# Patient Record
Sex: MALE | URBAN - METROPOLITAN AREA
[De-identification: names, ages, dates, MRNs, and addresses within clinical notes are randomized per-mention and may not be internally consistent; named-entity substitution may affect disease eponyms.]

---

## 2021-01-12 LAB — PROCALCITONIN: 0.03 NG/ML

## 2021-02-22 ENCOUNTER — VIRTUAL VISIT (OUTPATIENT)
Dept: INFECTIOUS DISEASES | Age: 86
End: 2021-02-22

## 2021-02-22 DIAGNOSIS — U07.1 COVID-19: Primary | ICD-10-CM

## 2021-02-22 PROCEDURE — 99212 OFFICE O/P EST SF 10 MIN: CPT | Performed by: INTERNAL MEDICINE

## 2021-02-22 ASSESSMENT — ENCOUNTER SYMPTOMS
SHORTNESS OF BREATH: 0
COUGH: 0
GASTROINTESTINAL NEGATIVE: 1

## 2021-02-22 NOTE — PROGRESS NOTES
Infectious Disease     Patient Name: Lisbeth Ambriz  Date: 2/22/2021  YOB: 1934  Medical Record Number: [id number]        Lisbeth Ambriz is a 80 y.o. male being evaluated by a Virtual Visit (video visit) encounter to address concerns as mentioned above. A caregiver was present when appropriate. Due to this being a TeleHealth encounter (During [facility] public health emergency), evaluation of the following organ systems was limited: Vitals/Constitutional/EENT/Resp/CV/GI//MS/Neuro/Skin/Heme-Lymph-Imm. Pursuant to the emergency declaration under the [address] [place] [address], [address] [place] [address] authority and the [employer] Act, this Virtual Visit was conducted with patient's (and/or legal guardian's) consent, to reduce the patient's risk of exposure to COVID-19 and provide necessary medical care. The patient (and/or legal guardian) has also been advised to contact this office for worsening conditions or problems, and seek emergency medical treatment and/or call 911 if deemed necessary. Patient identification was verified at the start of the visit: Yes    Total time spent for this encounter:15min        Services were provided through a video synchronous discussion virtually to substitute for in-person clinic visit. Patient and provider were located at their individual homes. --Mary Sharpe MD on 2/22/2021 at 10:36 AM    An electronic signature was used to authenticate this note. Patient was hospitalized with COVID-19 treated with remdesivir and steroids  Still wearing oxygen 3 L feeling well no fevers chills sweats no significant shortness of breath    Review of Systems   Constitutional: Negative. HENT: Negative. Respiratory: Negative for cough and shortness of breath. Cardiovascular: Negative. Gastrointestinal: Negative. Genitourinary: Negative. Review of Systems: All 14 review of systems negative other than as stated above    Social History     Tobacco Use    Smoking status: Not on file   Substance Use Topics    Alcohol use: Not on file    Drug use: Not on file               ASSESSMENT:  PLAN:    Covid recovered still on O2 slowly weaning down currently on 3 L nasal cannula    Patient has received first Covid vaccination will receive his second    Follow-up as needed

## 2023-06-20 LAB
ALANINE AMINOTRANSFERASE (SGPT) (U/L) IN SER/PLAS: 9 U/L (ref 10–52)
ALBUMIN (G/DL) IN SER/PLAS: 3.8 G/DL (ref 3.4–5)
ALKALINE PHOSPHATASE (U/L) IN SER/PLAS: 50 U/L (ref 33–136)
ANION GAP IN SER/PLAS: 12 MMOL/L (ref 10–20)
ASPARTATE AMINOTRANSFERASE (SGOT) (U/L) IN SER/PLAS: 16 U/L (ref 9–39)
BASOPHILS (10*3/UL) IN BLOOD BY AUTOMATED COUNT: 0.05 X10E9/L (ref 0–0.1)
BASOPHILS/100 LEUKOCYTES IN BLOOD BY AUTOMATED COUNT: 1 % (ref 0–2)
BILIRUBIN TOTAL (MG/DL) IN SER/PLAS: 1.1 MG/DL (ref 0–1.2)
CALCIUM (MG/DL) IN SER/PLAS: 8.9 MG/DL (ref 8.6–10.3)
CARBON DIOXIDE, TOTAL (MMOL/L) IN SER/PLAS: 22 MMOL/L (ref 21–32)
CHLORIDE (MMOL/L) IN SER/PLAS: 109 MMOL/L (ref 98–107)
CHOLESTEROL (MG/DL) IN SER/PLAS: 127 MG/DL (ref 0–199)
CHOLESTEROL IN HDL (MG/DL) IN SER/PLAS: 38.1 MG/DL
CHOLESTEROL/HDL RATIO: 3.3
CREATININE (MG/DL) IN SER/PLAS: 0.89 MG/DL (ref 0.5–1.3)
EOSINOPHILS (10*3/UL) IN BLOOD BY AUTOMATED COUNT: 0.08 X10E9/L (ref 0–0.4)
EOSINOPHILS/100 LEUKOCYTES IN BLOOD BY AUTOMATED COUNT: 1.6 % (ref 0–6)
ERYTHROCYTE DISTRIBUTION WIDTH (RATIO) BY AUTOMATED COUNT: 13.7 % (ref 11.5–14.5)
ERYTHROCYTE MEAN CORPUSCULAR HEMOGLOBIN CONCENTRATION (G/DL) BY AUTOMATED: 33 G/DL (ref 32–36)
ERYTHROCYTE MEAN CORPUSCULAR VOLUME (FL) BY AUTOMATED COUNT: 93 FL (ref 80–100)
ERYTHROCYTES (10*6/UL) IN BLOOD BY AUTOMATED COUNT: 4.75 X10E12/L (ref 4.5–5.9)
GFR MALE: 82 ML/MIN/1.73M2
GLUCOSE (MG/DL) IN SER/PLAS: 104 MG/DL (ref 74–99)
HEMATOCRIT (%) IN BLOOD BY AUTOMATED COUNT: 44 % (ref 41–52)
HEMOGLOBIN (G/DL) IN BLOOD: 14.5 G/DL (ref 13.5–17.5)
IMMATURE GRANULOCYTES/100 LEUKOCYTES IN BLOOD BY AUTOMATED COUNT: 0.4 % (ref 0–0.9)
LDL: 67 MG/DL (ref 0–99)
LEUKOCYTES (10*3/UL) IN BLOOD BY AUTOMATED COUNT: 5.1 X10E9/L (ref 4.4–11.3)
LYMPHOCYTES (10*3/UL) IN BLOOD BY AUTOMATED COUNT: 2.39 X10E9/L (ref 0.8–3)
LYMPHOCYTES/100 LEUKOCYTES IN BLOOD BY AUTOMATED COUNT: 46.5 % (ref 13–44)
MONOCYTES (10*3/UL) IN BLOOD BY AUTOMATED COUNT: 0.44 X10E9/L (ref 0.05–0.8)
MONOCYTES/100 LEUKOCYTES IN BLOOD BY AUTOMATED COUNT: 8.6 % (ref 2–10)
NEUTROPHILS (10*3/UL) IN BLOOD BY AUTOMATED COUNT: 2.16 X10E9/L (ref 1.6–5.5)
NEUTROPHILS/100 LEUKOCYTES IN BLOOD BY AUTOMATED COUNT: 41.9 % (ref 40–80)
PLATELETS (10*3/UL) IN BLOOD AUTOMATED COUNT: 169 X10E9/L (ref 150–450)
POTASSIUM (MMOL/L) IN SER/PLAS: 4 MMOL/L (ref 3.5–5.3)
PROTEIN TOTAL: 6.6 G/DL (ref 6.4–8.2)
SODIUM (MMOL/L) IN SER/PLAS: 139 MMOL/L (ref 136–145)
TRIGLYCERIDE (MG/DL) IN SER/PLAS: 108 MG/DL (ref 0–149)
UREA NITROGEN (MG/DL) IN SER/PLAS: 16 MG/DL (ref 6–23)
VLDL: 22 MG/DL (ref 0–40)

## 2023-10-23 ENCOUNTER — LAB (OUTPATIENT)
Dept: LAB | Facility: LAB | Age: 88
End: 2023-10-23
Payer: MEDICARE

## 2023-10-23 DIAGNOSIS — N18.2 CHRONIC KIDNEY DISEASE, STAGE 2 (MILD): ICD-10-CM

## 2023-10-23 DIAGNOSIS — Z12.5 ENCOUNTER FOR SCREENING FOR MALIGNANT NEOPLASM OF PROSTATE: ICD-10-CM

## 2023-10-23 DIAGNOSIS — I12.9 HYPERTENSIVE CHRONIC KIDNEY DISEASE WITH STAGE 1 THROUGH STAGE 4 CHRONIC KIDNEY DISEASE, OR UNSPECIFIED CHRONIC KIDNEY DISEASE: ICD-10-CM

## 2023-10-23 DIAGNOSIS — E78.49 OTHER HYPERLIPIDEMIA: ICD-10-CM

## 2023-10-23 DIAGNOSIS — N18.2 CHRONIC KIDNEY DISEASE, STAGE 2 (MILD): Primary | ICD-10-CM

## 2023-10-23 LAB
ALBUMIN SERPL BCP-MCNC: 3.8 G/DL (ref 3.4–5)
ALP SERPL-CCNC: 50 U/L (ref 33–136)
ALT SERPL W P-5'-P-CCNC: 9 U/L (ref 10–52)
ANION GAP SERPL CALC-SCNC: 14 MMOL/L (ref 10–20)
AST SERPL W P-5'-P-CCNC: 12 U/L (ref 9–39)
BASOPHILS # BLD AUTO: 0.05 X10*3/UL (ref 0–0.1)
BASOPHILS NFR BLD AUTO: 1.1 %
BILIRUB SERPL-MCNC: 1.1 MG/DL (ref 0–1.2)
BUN SERPL-MCNC: 17 MG/DL (ref 6–23)
CALCIUM SERPL-MCNC: 8.7 MG/DL (ref 8.6–10.3)
CHLORIDE SERPL-SCNC: 109 MMOL/L (ref 98–107)
CHOLEST SERPL-MCNC: 129 MG/DL (ref 0–199)
CHOLESTEROL/HDL RATIO: 3.5
CO2 SERPL-SCNC: 21 MMOL/L (ref 21–32)
CREAT SERPL-MCNC: 0.92 MG/DL (ref 0.5–1.3)
EOSINOPHIL # BLD AUTO: 0.06 X10*3/UL (ref 0–0.4)
EOSINOPHIL NFR BLD AUTO: 1.3 %
ERYTHROCYTE [DISTWIDTH] IN BLOOD BY AUTOMATED COUNT: 14 % (ref 11.5–14.5)
GFR SERPL CREATININE-BSD FRML MDRD: 80 ML/MIN/1.73M*2
GLUCOSE SERPL-MCNC: 100 MG/DL (ref 74–99)
HCT VFR BLD AUTO: 41 % (ref 41–52)
HDLC SERPL-MCNC: 37.2 MG/DL
HGB BLD-MCNC: 13.5 G/DL (ref 13.5–17.5)
IMM GRANULOCYTES # BLD AUTO: 0.01 X10*3/UL (ref 0–0.5)
IMM GRANULOCYTES NFR BLD AUTO: 0.2 % (ref 0–0.9)
LDLC SERPL CALC-MCNC: 71 MG/DL
LYMPHOCYTES # BLD AUTO: 2.12 X10*3/UL (ref 0.8–3)
LYMPHOCYTES NFR BLD AUTO: 46.6 %
MCH RBC QN AUTO: 30.8 PG (ref 26–34)
MCHC RBC AUTO-ENTMCNC: 32.9 G/DL (ref 32–36)
MCV RBC AUTO: 93 FL (ref 80–100)
MONOCYTES # BLD AUTO: 0.35 X10*3/UL (ref 0.05–0.8)
MONOCYTES NFR BLD AUTO: 7.7 %
NEUTROPHILS # BLD AUTO: 1.96 X10*3/UL (ref 1.6–5.5)
NEUTROPHILS NFR BLD AUTO: 43.1 %
NON HDL CHOLESTEROL: 92 MG/DL (ref 0–149)
NRBC BLD-RTO: 0 /100 WBCS (ref 0–0)
PLATELET # BLD AUTO: 169 X10*3/UL (ref 150–450)
PMV BLD AUTO: 11.1 FL (ref 7.5–11.5)
POTASSIUM SERPL-SCNC: 4 MMOL/L (ref 3.5–5.3)
PROT SERPL-MCNC: 6.3 G/DL (ref 6.4–8.2)
PSA SERPL-MCNC: 0.31 NG/ML
RBC # BLD AUTO: 4.39 X10*6/UL (ref 4.5–5.9)
SODIUM SERPL-SCNC: 140 MMOL/L (ref 136–145)
TRIGL SERPL-MCNC: 105 MG/DL (ref 0–149)
VLDL: 21 MG/DL (ref 0–40)
WBC # BLD AUTO: 4.6 X10*3/UL (ref 4.4–11.3)

## 2023-10-23 PROCEDURE — 80053 COMPREHEN METABOLIC PANEL: CPT

## 2023-10-23 PROCEDURE — G0103 PSA SCREENING: HCPCS

## 2023-10-23 PROCEDURE — 85025 COMPLETE CBC W/AUTO DIFF WBC: CPT

## 2023-10-23 PROCEDURE — 80061 LIPID PANEL: CPT

## 2023-10-23 PROCEDURE — 36415 COLL VENOUS BLD VENIPUNCTURE: CPT

## 2024-02-19 ENCOUNTER — LAB (OUTPATIENT)
Dept: LAB | Facility: LAB | Age: 89
End: 2024-02-19
Payer: MEDICARE

## 2024-02-19 DIAGNOSIS — R79.9 ABNORMAL FINDING OF BLOOD CHEMISTRY, UNSPECIFIED: ICD-10-CM

## 2024-02-19 DIAGNOSIS — I12.9 HYPERTENSIVE CHRONIC KIDNEY DISEASE WITH STAGE 1 THROUGH STAGE 4 CHRONIC KIDNEY DISEASE, OR UNSPECIFIED CHRONIC KIDNEY DISEASE: ICD-10-CM

## 2024-02-19 DIAGNOSIS — N18.2 CHRONIC KIDNEY DISEASE, STAGE 2 (MILD): Primary | ICD-10-CM

## 2024-02-19 DIAGNOSIS — E78.49 OTHER HYPERLIPIDEMIA: ICD-10-CM

## 2024-02-19 DIAGNOSIS — R82.998 OTHER ABNORMAL FINDINGS IN URINE: ICD-10-CM

## 2024-02-19 LAB
ALBUMIN SERPL BCP-MCNC: 3.8 G/DL (ref 3.4–5)
ALP SERPL-CCNC: 41 U/L (ref 33–136)
ALT SERPL W P-5'-P-CCNC: 10 U/L (ref 10–52)
ANION GAP SERPL CALC-SCNC: 12 MMOL/L (ref 10–20)
AST SERPL W P-5'-P-CCNC: 12 U/L (ref 9–39)
BASOPHILS # BLD AUTO: 0.04 X10*3/UL (ref 0–0.1)
BASOPHILS NFR BLD AUTO: 0.8 %
BILIRUB SERPL-MCNC: 0.7 MG/DL (ref 0–1.2)
BUN SERPL-MCNC: 19 MG/DL (ref 6–23)
CALCIUM SERPL-MCNC: 8.9 MG/DL (ref 8.6–10.3)
CHLORIDE SERPL-SCNC: 109 MMOL/L (ref 98–107)
CHOLEST SERPL-MCNC: 122 MG/DL (ref 0–199)
CHOLESTEROL/HDL RATIO: 3.5
CO2 SERPL-SCNC: 22 MMOL/L (ref 21–32)
CREAT SERPL-MCNC: 0.98 MG/DL (ref 0.5–1.3)
EGFRCR SERPLBLD CKD-EPI 2021: 74 ML/MIN/1.73M*2
EOSINOPHIL # BLD AUTO: 0.07 X10*3/UL (ref 0–0.4)
EOSINOPHIL NFR BLD AUTO: 1.5 %
ERYTHROCYTE [DISTWIDTH] IN BLOOD BY AUTOMATED COUNT: 13.8 % (ref 11.5–14.5)
GLUCOSE SERPL-MCNC: 98 MG/DL (ref 74–99)
HCT VFR BLD AUTO: 41 % (ref 41–52)
HDLC SERPL-MCNC: 35.1 MG/DL
HGB BLD-MCNC: 13.6 G/DL (ref 13.5–17.5)
IMM GRANULOCYTES # BLD AUTO: 0.02 X10*3/UL (ref 0–0.5)
IMM GRANULOCYTES NFR BLD AUTO: 0.4 % (ref 0–0.9)
LDLC SERPL CALC-MCNC: 67 MG/DL
LYMPHOCYTES # BLD AUTO: 1.98 X10*3/UL (ref 0.8–3)
LYMPHOCYTES NFR BLD AUTO: 41.3 %
MCH RBC QN AUTO: 30.4 PG (ref 26–34)
MCHC RBC AUTO-ENTMCNC: 33.2 G/DL (ref 32–36)
MCV RBC AUTO: 92 FL (ref 80–100)
MONOCYTES # BLD AUTO: 0.33 X10*3/UL (ref 0.05–0.8)
MONOCYTES NFR BLD AUTO: 6.9 %
NEUTROPHILS # BLD AUTO: 2.35 X10*3/UL (ref 1.6–5.5)
NEUTROPHILS NFR BLD AUTO: 49.1 %
NON HDL CHOLESTEROL: 87 MG/DL (ref 0–149)
NRBC BLD-RTO: 0 /100 WBCS (ref 0–0)
PLATELET # BLD AUTO: 162 X10*3/UL (ref 150–450)
POTASSIUM SERPL-SCNC: 3.8 MMOL/L (ref 3.5–5.3)
PROT SERPL-MCNC: 6.4 G/DL (ref 6.4–8.2)
RBC # BLD AUTO: 4.48 X10*6/UL (ref 4.5–5.9)
SODIUM SERPL-SCNC: 139 MMOL/L (ref 136–145)
TRIGL SERPL-MCNC: 102 MG/DL (ref 0–149)
VLDL: 20 MG/DL (ref 0–40)
WBC # BLD AUTO: 4.8 X10*3/UL (ref 4.4–11.3)

## 2024-02-19 PROCEDURE — 85025 COMPLETE CBC W/AUTO DIFF WBC: CPT

## 2024-02-19 PROCEDURE — 36415 COLL VENOUS BLD VENIPUNCTURE: CPT

## 2024-02-19 PROCEDURE — 80053 COMPREHEN METABOLIC PANEL: CPT

## 2024-02-19 PROCEDURE — 80061 LIPID PANEL: CPT

## 2024-06-27 ENCOUNTER — LAB (OUTPATIENT)
Dept: LAB | Facility: LAB | Age: 89
End: 2024-06-27
Payer: MEDICARE

## 2024-06-27 DIAGNOSIS — E78.49 OTHER HYPERLIPIDEMIA: ICD-10-CM

## 2024-06-27 DIAGNOSIS — R82.998 OTHER ABNORMAL FINDINGS IN URINE: ICD-10-CM

## 2024-06-27 DIAGNOSIS — Z12.5 ENCOUNTER FOR SCREENING FOR MALIGNANT NEOPLASM OF PROSTATE: ICD-10-CM

## 2024-06-27 DIAGNOSIS — R79.9 ABNORMAL FINDING OF BLOOD CHEMISTRY, UNSPECIFIED: ICD-10-CM

## 2024-06-27 DIAGNOSIS — Z13.89 ENCOUNTER FOR SCREENING FOR OTHER DISORDER: ICD-10-CM

## 2024-06-27 DIAGNOSIS — N18.2 CHRONIC KIDNEY DISEASE, STAGE 2 (MILD): Primary | ICD-10-CM

## 2024-06-27 DIAGNOSIS — I12.9 HYPERTENSIVE CHRONIC KIDNEY DISEASE WITH STAGE 1 THROUGH STAGE 4 CHRONIC KIDNEY DISEASE, OR UNSPECIFIED CHRONIC KIDNEY DISEASE: ICD-10-CM

## 2024-06-27 LAB
ALBUMIN SERPL BCP-MCNC: 4 G/DL (ref 3.4–5)
ALP SERPL-CCNC: 45 U/L (ref 33–136)
ALT SERPL W P-5'-P-CCNC: 9 U/L (ref 10–52)
ANION GAP SERPL CALC-SCNC: 13 MMOL/L (ref 10–20)
AST SERPL W P-5'-P-CCNC: 11 U/L (ref 9–39)
BASOPHILS # BLD AUTO: 0.03 X10*3/UL (ref 0–0.1)
BASOPHILS NFR BLD AUTO: 0.6 %
BILIRUB SERPL-MCNC: 1.2 MG/DL (ref 0–1.2)
BUN SERPL-MCNC: 19 MG/DL (ref 6–23)
CALCIUM SERPL-MCNC: 8.7 MG/DL (ref 8.6–10.3)
CHLORIDE SERPL-SCNC: 110 MMOL/L (ref 98–107)
CHOLEST SERPL-MCNC: 121 MG/DL (ref 0–199)
CHOLESTEROL/HDL RATIO: 3.5
CO2 SERPL-SCNC: 21 MMOL/L (ref 21–32)
CREAT SERPL-MCNC: 0.98 MG/DL (ref 0.5–1.3)
EGFRCR SERPLBLD CKD-EPI 2021: 74 ML/MIN/1.73M*2
EOSINOPHIL # BLD AUTO: 0.06 X10*3/UL (ref 0–0.4)
EOSINOPHIL NFR BLD AUTO: 1.2 %
ERYTHROCYTE [DISTWIDTH] IN BLOOD BY AUTOMATED COUNT: 13.6 % (ref 11.5–14.5)
GLUCOSE SERPL-MCNC: 110 MG/DL (ref 74–99)
HCT VFR BLD AUTO: 42.1 % (ref 41–52)
HDLC SERPL-MCNC: 34.6 MG/DL
HGB BLD-MCNC: 14 G/DL (ref 13.5–17.5)
IMM GRANULOCYTES # BLD AUTO: 0.02 X10*3/UL (ref 0–0.5)
IMM GRANULOCYTES NFR BLD AUTO: 0.4 % (ref 0–0.9)
LDLC SERPL CALC-MCNC: 64 MG/DL
LYMPHOCYTES # BLD AUTO: 2.25 X10*3/UL (ref 0.8–3)
LYMPHOCYTES NFR BLD AUTO: 44.8 %
MCH RBC QN AUTO: 31.3 PG (ref 26–34)
MCHC RBC AUTO-ENTMCNC: 33.3 G/DL (ref 32–36)
MCV RBC AUTO: 94 FL (ref 80–100)
MONOCYTES # BLD AUTO: 0.32 X10*3/UL (ref 0.05–0.8)
MONOCYTES NFR BLD AUTO: 6.4 %
NEUTROPHILS # BLD AUTO: 2.34 X10*3/UL (ref 1.6–5.5)
NEUTROPHILS NFR BLD AUTO: 46.6 %
NON HDL CHOLESTEROL: 86 MG/DL (ref 0–149)
NRBC BLD-RTO: 0 /100 WBCS (ref 0–0)
PLATELET # BLD AUTO: 161 X10*3/UL (ref 150–450)
POTASSIUM SERPL-SCNC: 4.2 MMOL/L (ref 3.5–5.3)
PROT SERPL-MCNC: 6.4 G/DL (ref 6.4–8.2)
PSA SERPL-MCNC: 0.48 NG/ML
RBC # BLD AUTO: 4.47 X10*6/UL (ref 4.5–5.9)
SODIUM SERPL-SCNC: 140 MMOL/L (ref 136–145)
TRIGL SERPL-MCNC: 111 MG/DL (ref 0–149)
VLDL: 22 MG/DL (ref 0–40)
WBC # BLD AUTO: 5 X10*3/UL (ref 4.4–11.3)

## 2024-06-27 PROCEDURE — 36415 COLL VENOUS BLD VENIPUNCTURE: CPT

## 2024-06-27 PROCEDURE — 80061 LIPID PANEL: CPT

## 2024-06-27 PROCEDURE — G0103 PSA SCREENING: HCPCS

## 2024-06-27 PROCEDURE — 80053 COMPREHEN METABOLIC PANEL: CPT

## 2024-06-27 PROCEDURE — 85025 COMPLETE CBC W/AUTO DIFF WBC: CPT

## 2024-07-03 ENCOUNTER — APPOINTMENT (OUTPATIENT)
Dept: RADIOLOGY | Facility: HOSPITAL | Age: 89
End: 2024-07-03
Payer: MEDICARE

## 2024-07-03 ENCOUNTER — HOSPITAL ENCOUNTER (EMERGENCY)
Facility: HOSPITAL | Age: 89
Discharge: HOME | End: 2024-07-03
Payer: MEDICARE

## 2024-07-03 VITALS
RESPIRATION RATE: 18 BRPM | HEART RATE: 52 BPM | TEMPERATURE: 97.7 F | BODY MASS INDEX: 26.18 KG/M2 | WEIGHT: 215 LBS | SYSTOLIC BLOOD PRESSURE: 153 MMHG | DIASTOLIC BLOOD PRESSURE: 70 MMHG | HEIGHT: 76 IN | OXYGEN SATURATION: 99 %

## 2024-07-03 DIAGNOSIS — R33.9 URINARY RETENTION: Primary | ICD-10-CM

## 2024-07-03 LAB
ALBUMIN SERPL BCP-MCNC: 3.8 G/DL (ref 3.4–5)
ALP SERPL-CCNC: 42 U/L (ref 33–136)
ALT SERPL W P-5'-P-CCNC: 8 U/L (ref 10–52)
ANION GAP SERPL CALC-SCNC: 13 MMOL/L (ref 10–20)
APPEARANCE UR: CLEAR
AST SERPL W P-5'-P-CCNC: 12 U/L (ref 9–39)
BASOPHILS # BLD AUTO: 0.02 X10*3/UL (ref 0–0.1)
BASOPHILS NFR BLD AUTO: 0.5 %
BILIRUB SERPL-MCNC: 0.6 MG/DL (ref 0–1.2)
BILIRUB UR STRIP.AUTO-MCNC: NEGATIVE MG/DL
BUN SERPL-MCNC: 21 MG/DL (ref 6–23)
CALCIUM SERPL-MCNC: 8.8 MG/DL (ref 8.6–10.3)
CHLORIDE SERPL-SCNC: 110 MMOL/L (ref 98–107)
CO2 SERPL-SCNC: 18 MMOL/L (ref 21–32)
COLOR UR: YELLOW
CREAT SERPL-MCNC: 1.05 MG/DL (ref 0.5–1.3)
EGFRCR SERPLBLD CKD-EPI 2021: 68 ML/MIN/1.73M*2
EOSINOPHIL # BLD AUTO: 0.04 X10*3/UL (ref 0–0.4)
EOSINOPHIL NFR BLD AUTO: 1 %
ERYTHROCYTE [DISTWIDTH] IN BLOOD BY AUTOMATED COUNT: 13.2 % (ref 11.5–14.5)
GLUCOSE SERPL-MCNC: 110 MG/DL (ref 74–99)
GLUCOSE UR STRIP.AUTO-MCNC: NORMAL MG/DL
HCT VFR BLD AUTO: 39.8 % (ref 41–52)
HGB BLD-MCNC: 13.8 G/DL (ref 13.5–17.5)
IMM GRANULOCYTES # BLD AUTO: 0.01 X10*3/UL (ref 0–0.5)
IMM GRANULOCYTES NFR BLD AUTO: 0.2 % (ref 0–0.9)
KETONES UR STRIP.AUTO-MCNC: ABNORMAL MG/DL
LEUKOCYTE ESTERASE UR QL STRIP.AUTO: NEGATIVE
LYMPHOCYTES # BLD AUTO: 1.37 X10*3/UL (ref 0.8–3)
LYMPHOCYTES NFR BLD AUTO: 32.7 %
MCH RBC QN AUTO: 31.2 PG (ref 26–34)
MCHC RBC AUTO-ENTMCNC: 34.7 G/DL (ref 32–36)
MCV RBC AUTO: 90 FL (ref 80–100)
MONOCYTES # BLD AUTO: 0.32 X10*3/UL (ref 0.05–0.8)
MONOCYTES NFR BLD AUTO: 7.6 %
NEUTROPHILS # BLD AUTO: 2.43 X10*3/UL (ref 1.6–5.5)
NEUTROPHILS NFR BLD AUTO: 58 %
NITRITE UR QL STRIP.AUTO: NEGATIVE
NRBC BLD-RTO: 0 /100 WBCS (ref 0–0)
PH UR STRIP.AUTO: 5.5 [PH]
PLATELET # BLD AUTO: 156 X10*3/UL (ref 150–450)
POTASSIUM SERPL-SCNC: 4 MMOL/L (ref 3.5–5.3)
PROT SERPL-MCNC: 6.3 G/DL (ref 6.4–8.2)
PROT UR STRIP.AUTO-MCNC: NEGATIVE MG/DL
RBC # BLD AUTO: 4.42 X10*6/UL (ref 4.5–5.9)
RBC # UR STRIP.AUTO: NEGATIVE /UL
SODIUM SERPL-SCNC: 137 MMOL/L (ref 136–145)
SP GR UR STRIP.AUTO: 1.04
UROBILINOGEN UR STRIP.AUTO-MCNC: ABNORMAL MG/DL
WBC # BLD AUTO: 4.2 X10*3/UL (ref 4.4–11.3)

## 2024-07-03 PROCEDURE — 80053 COMPREHEN METABOLIC PANEL: CPT | Performed by: REGISTERED NURSE

## 2024-07-03 PROCEDURE — 85025 COMPLETE CBC W/AUTO DIFF WBC: CPT | Performed by: REGISTERED NURSE

## 2024-07-03 PROCEDURE — 2550000001 HC RX 255 CONTRASTS: Performed by: REGISTERED NURSE

## 2024-07-03 PROCEDURE — 51702 INSERT TEMP BLADDER CATH: CPT

## 2024-07-03 PROCEDURE — 99284 EMERGENCY DEPT VISIT MOD MDM: CPT | Performed by: REGISTERED NURSE

## 2024-07-03 PROCEDURE — 74177 CT ABD & PELVIS W/CONTRAST: CPT

## 2024-07-03 PROCEDURE — 51798 US URINE CAPACITY MEASURE: CPT | Performed by: REGISTERED NURSE

## 2024-07-03 PROCEDURE — 81003 URINALYSIS AUTO W/O SCOPE: CPT | Performed by: REGISTERED NURSE

## 2024-07-03 PROCEDURE — 2500000004 HC RX 250 GENERAL PHARMACY W/ HCPCS (ALT 636 FOR OP/ED): Performed by: REGISTERED NURSE

## 2024-07-03 PROCEDURE — 74177 CT ABD & PELVIS W/CONTRAST: CPT | Performed by: RADIOLOGY

## 2024-07-03 PROCEDURE — 36415 COLL VENOUS BLD VENIPUNCTURE: CPT | Performed by: REGISTERED NURSE

## 2024-07-03 PROCEDURE — 96374 THER/PROPH/DIAG INJ IV PUSH: CPT | Performed by: REGISTERED NURSE

## 2024-07-03 RX ORDER — ACETAMINOPHEN 325 MG/1
650 TABLET ORAL ONCE
Status: COMPLETED | OUTPATIENT
Start: 2024-07-03 | End: 2024-07-03

## 2024-07-03 RX ORDER — KETOROLAC TROMETHAMINE 30 MG/ML
15 INJECTION, SOLUTION INTRAMUSCULAR; INTRAVENOUS ONCE
Status: COMPLETED | OUTPATIENT
Start: 2024-07-03 | End: 2024-07-03

## 2024-07-03 ASSESSMENT — LIFESTYLE VARIABLES
EVER HAD A DRINK FIRST THING IN THE MORNING TO STEADY YOUR NERVES TO GET RID OF A HANGOVER: NO
TOTAL SCORE: 0
HAVE PEOPLE ANNOYED YOU BY CRITICIZING YOUR DRINKING: NO
EVER FELT BAD OR GUILTY ABOUT YOUR DRINKING: NO
HAVE YOU EVER FELT YOU SHOULD CUT DOWN ON YOUR DRINKING: NO

## 2024-07-03 ASSESSMENT — PAIN SCALES - GENERAL
PAINLEVEL_OUTOF10: 9
PAINLEVEL_OUTOF10: 8
PAINLEVEL_OUTOF10: 6
PAINLEVEL_OUTOF10: 6

## 2024-07-03 ASSESSMENT — PAIN - FUNCTIONAL ASSESSMENT
PAIN_FUNCTIONAL_ASSESSMENT: 0-10
PAIN_FUNCTIONAL_ASSESSMENT: 0-10

## 2024-07-03 ASSESSMENT — PAIN DESCRIPTION - PROGRESSION: CLINICAL_PROGRESSION: GRADUALLY IMPROVING

## 2024-07-03 ASSESSMENT — PAIN DESCRIPTION - LOCATION: LOCATION: ABDOMEN

## 2024-07-03 ASSESSMENT — PAIN DESCRIPTION - FREQUENCY: FREQUENCY: CONSTANT/CONTINUOUS

## 2024-07-03 ASSESSMENT — PAIN DESCRIPTION - DESCRIPTORS: DESCRIPTORS: SORE

## 2024-07-03 ASSESSMENT — PAIN DESCRIPTION - ORIENTATION: ORIENTATION: LOWER

## 2024-07-03 ASSESSMENT — PAIN DESCRIPTION - PAIN TYPE: TYPE: ACUTE PAIN

## 2024-07-03 ASSESSMENT — COLUMBIA-SUICIDE SEVERITY RATING SCALE - C-SSRS
1. IN THE PAST MONTH, HAVE YOU WISHED YOU WERE DEAD OR WISHED YOU COULD GO TO SLEEP AND NOT WAKE UP?: NO
2. HAVE YOU ACTUALLY HAD ANY THOUGHTS OF KILLING YOURSELF?: NO
6. HAVE YOU EVER DONE ANYTHING, STARTED TO DO ANYTHING, OR PREPARED TO DO ANYTHING TO END YOUR LIFE?: NO

## 2024-07-03 NOTE — ED PROVIDER NOTES
"HPI   Chief Complaint   Patient presents with    prostate pain     Pt states he is having trouble with his prostate and now can't urinate     89-year-old male presents emergency department today for evaluation of lower abdominal pain and poor urinary output.  Patient is a poor historian and is hard of hearing so HPI is limited secondary to this.  Patient tells me he started to have pain in his lower abdomen last evening.  He tells me that today throughout the day the pain has persisted.  Patient tells me that he does have prostate \"problems\" that he takes medications for but he cannot recall the name.  Patient tells me he is also had poor urinary output throughout the day.  Patient tells me it has been quite a while since the last time he voided. He tells me his urologist is no longer practicing.    [facility]  Patient does have a history of hyperlipidemia, GERD, BPH, poor balance and chronic anxiety.  Patient is prescribed finasteride for his prostate. patient's primary care doctor is Dr. Carter.      History provided by:  Medical records and patient   used: No                        Kingsley Coma Scale Score: 15                     Patient History   History reviewed. No pertinent past medical history.  History reviewed. No pertinent surgical history.  No family history on file.  Social History     Tobacco Use    Smoking status: Not on file    Smokeless tobacco: Not on file   Substance Use Topics    Alcohol use: Not on file    Drug use: Not on file       Physical Exam   ED Triage Vitals [07/03/24 1520]   Temperature Heart Rate Respirations BP   36.5 °C (97.7 °F) 81 16 136/69      Pulse Ox Temp src Heart Rate Source Patient Position   96 % -- -- --      BP Location FiO2 (%)     -- --       Physical Exam    ED Course & MDM   Diagnoses as of 07/03/24 1650   Urinary retention       Medical Decision Making  Patient seen examined in " triage; patient is healthy and nontoxic in appearance does not appear in acute distress.  Patient is suprapubic area is distended and tender with palpation.  Remainder of patient's abdomen is soft and nontender.  Lung sounds are clear bilaterally without any adventitious noise.  Heart regular rate and rhythm without murmur appreciated.  Will bladder scan to evaluate for retained urine.  Also obtain a CBC and CMP to evaluate for infection and kidney function.  Also obtain CT abdomen pelvis to evaluate bladder abnormalities contributing to retention.    I was notified by ED staff that patient's Perez was greater than 350.  Order for Perez catheter placed.    Patient's CBC is unremarkable.  Patient CMP significant for an elevated chloride.  CT of the abdomen pelvis is without any abnormalities in the abdomen or pelvis.  There is no renal or ureteral calculi no hydronephrosis or ureter nephrosis no bladder wall thickening.  Urinalysis is without sign of infection.    Registration was able to make patient an appointment with urology on Wednesday, July 10.  Patient struck to use Tylenol at home for symptom management.  Also instructed to continue to increase fluids.  We did discuss that his Perez catheter will be uncomfortable at times.  Patient sent with education on how to use his catheter and bedside nurse did give him both a leg bag and regular sized bag.  All patient and his wife's questions and concerns were addressed prior to discharge.  Patient discharged home in stable condition.      Procedure  Procedures     Mary Kauffman, MARLEE-CARLOS MANUEL  07/03/24 3689

## 2024-07-04 LAB — HOLD SPECIMEN: NORMAL

## 2024-07-10 ENCOUNTER — APPOINTMENT (OUTPATIENT)
Dept: UROLOGY | Facility: CLINIC | Age: 89
End: 2024-07-10
Payer: MEDICARE

## 2024-07-10 VITALS — TEMPERATURE: 97.3 F | DIASTOLIC BLOOD PRESSURE: 73 MMHG | SYSTOLIC BLOOD PRESSURE: 136 MMHG | HEART RATE: 89 BPM

## 2024-07-10 DIAGNOSIS — R33.9 URINARY RETENTION: ICD-10-CM

## 2024-07-10 DIAGNOSIS — N40.1 BENIGN PROSTATIC HYPERPLASIA WITH URINARY OBSTRUCTION: Primary | ICD-10-CM

## 2024-07-10 DIAGNOSIS — N13.8 BENIGN PROSTATIC HYPERPLASIA WITH URINARY OBSTRUCTION: Primary | ICD-10-CM

## 2024-07-10 PROCEDURE — 51700 IRRIGATION OF BLADDER: CPT | Performed by: STUDENT IN AN ORGANIZED HEALTH CARE EDUCATION/TRAINING PROGRAM

## 2024-07-10 PROCEDURE — 1036F TOBACCO NON-USER: CPT | Performed by: STUDENT IN AN ORGANIZED HEALTH CARE EDUCATION/TRAINING PROGRAM

## 2024-07-10 PROCEDURE — 99204 OFFICE O/P NEW MOD 45 MIN: CPT | Performed by: STUDENT IN AN ORGANIZED HEALTH CARE EDUCATION/TRAINING PROGRAM

## 2024-07-10 PROCEDURE — 1160F RVW MEDS BY RX/DR IN RCRD: CPT | Performed by: STUDENT IN AN ORGANIZED HEALTH CARE EDUCATION/TRAINING PROGRAM

## 2024-07-10 PROCEDURE — 1159F MED LIST DOCD IN RCRD: CPT | Performed by: STUDENT IN AN ORGANIZED HEALTH CARE EDUCATION/TRAINING PROGRAM

## 2024-07-10 RX ORDER — FINASTERIDE 5 MG/1
5 TABLET, FILM COATED ORAL
COMMUNITY
Start: 2024-02-28 | End: 2024-07-10 | Stop reason: SDUPTHER

## 2024-07-10 RX ORDER — DOXAZOSIN 2 MG/1
1 TABLET ORAL NIGHTLY
Qty: 90 TABLET | Refills: 3 | Status: SHIPPED | OUTPATIENT
Start: 2024-07-10 | End: 2024-07-11 | Stop reason: ALTCHOICE

## 2024-07-10 RX ORDER — DOXAZOSIN 2 MG/1
1 TABLET ORAL NIGHTLY
COMMUNITY
Start: 2022-06-03 | End: 2024-07-10 | Stop reason: SDUPTHER

## 2024-07-10 RX ORDER — OMEPRAZOLE 20 MG/1
20 CAPSULE, DELAYED RELEASE ORAL
COMMUNITY
Start: 2024-01-22

## 2024-07-10 RX ORDER — FINASTERIDE 5 MG/1
5 TABLET, FILM COATED ORAL DAILY
COMMUNITY
End: 2024-07-10 | Stop reason: SDUPTHER

## 2024-07-10 RX ORDER — LISINOPRIL 20 MG/1
20 TABLET ORAL
COMMUNITY
Start: 2024-02-28

## 2024-07-10 RX ORDER — SIMVASTATIN 40 MG/1
40 TABLET, FILM COATED ORAL
COMMUNITY
Start: 2024-02-28

## 2024-07-10 RX ORDER — FINASTERIDE 5 MG/1
5 TABLET, FILM COATED ORAL DAILY
Qty: 90 TABLET | Refills: 3 | Status: SHIPPED | OUTPATIENT
Start: 2024-07-10

## 2024-07-10 RX ORDER — ALPRAZOLAM 0.5 MG/1
0.5 TABLET ORAL 2 TIMES DAILY
COMMUNITY

## 2024-07-10 NOTE — PROGRESS NOTES
Scribed for Dr. Ger Hayes by Je Cardenas. I, Dr. Ger Hayes have personally reviewed and agreed with the information entered by the Virtual Scribe. 07/10/24.    ASSESSMENT:  Problem List Items Addressed This Visit       Benign prostatic hyperplasia with urinary obstruction - Primary    Relevant Medications    doxazosin (Cardura) 2 mg tablet    finasteride (Proscar) 5 mg tablet    Urinary retention    Relevant Medications    doxazosin (Cardura) 2 mg tablet    finasteride (Proscar) 5 mg tablet    Other Relevant Orders    Voiding Trial (Completed)      PLAN:  #BPH with retention  Passed TOV today.   Advised to discontinue OTC sleep medication.   States his urinary symptoms well-controlled on current regiment.   Refills provided. Will start doxazosin back as he states he has not been taking this. Risks, benefits, and alternatives discussed. He agrees to this, tolerated it in the past.     RTC in 1 year for reassessment and PVR.     All questions were answered to the patient’s satisfaction.  Patient agrees with the plan and wishes to proceed.  Continue follow-up for ongoing care of his chronic medical conditions.       History of Present Illness (HPI):  Fran presents as a new patient for an evaluation.  The patient’s EMR has been reviewed.  Lives in [place], [place].  [facility];   Former Urologist since retired.  Previously saw Dr. Kimball in [place].     Recent ED visit (07/03/24)  Presented for abdominal pain + urinary retention.   CT imaging showed no stones or hydronephrosis BL.   No bladder wall thickening.   Perez was placed for his retention. (~350 mL)  Takes finasteride and doxazosin for urinary symptoms.    TODAY: (07/10/24)  Presents for outpatient follow up and TOV.   States that he recently started OTC sleeping pills; (Contained diphenhydramine)  Attributes his worsening flow and recent retention to this.   Otherwise, denies any acute complaints.   Passed TOV today.     No recent UTI's over  the past year.   Denies any gross hematuria, flank pain, fevers or chills.     PMH: hyperlipidemia, GERD, BPH, poor balance and chronic anxiety   PSH: Denies  FH: Denies FH of  malignancy.   SH: Non-smoker.     No past medical history on file.  No past surgical history on file.  No family history on file.  Social History     Tobacco Use   Smoking Status Not on file   Smokeless Tobacco Not on file     No current outpatient medications on file.     No current facility-administered medications for this visit.     No Known Allergies  Past medical, surgical, family and social history in the chart was reviewed and is accurate including any additions to what is in this HPI.    REVIEW OF SYSTEMS (ROS):   Constitutional: denies any unintentional weight loss or change in strength.  Integumentary: denies any rashes or pruritus.  Eyes: denies any double vision or eye pain.  Ear/Nose/Mouth/Throat: denies any nosebleeds or gum bleeds.  Cardiovascular: denies any chest pain or syncope.  Respiratory: denies hemoptysis.  Gastrointestinal: denies nausea or vomiting.  Musculoskeletal: denies muscle cramping or weakness.  Neurologic: denies convulsions or seizures.  Hematologic/Lymphatic: denies bleeding tendencies.  Endocrine: denies heat/cold intolerance.  All other systems have been reviewed and are negative unless otherwise noted in the HPI.     OBJECTIVE:  There were no vitals taken for this visit.  PHYSICAL EXAM:  Constitutional: No obvious distress.  Eyes: Non-injected conjunctiva, sclera clear, EOMI.  Ears/Nose/Mouth/Throat: No obvious drainage per ears or nose.  Cardiovascular: Extremities are warm and well perfused. No edema, cyanosis or pallor.  Respiratory: No audible wheezing/stridor; respirations do not appear labored.  Gastrointestinal: Abdomen soft, not distended.  Musculoskeletal: Normal ROM of extremities.  Skin: No obvious rashes or open sores.  Neurologic: Alert and oriented, CN 2-12 grossly intact.  Psychiatric:  Answers questions appropriately with normal affect.  Hematologic/Lymphatic/Immunologic: No obvious bruises or sites of spontaneous bleeding.  Genitourinary: No CVA tenderness, bladder not palpable.     LABS & IMAGING:  Lab Results   Component Value Date    WBC 4.2 (L) 07/03/2024    HGB 13.8 07/03/2024    HCT 39.8 (L) 07/03/2024     07/03/2024    CHOL 121 06/27/2024    TRIG 111 06/27/2024    HDL 34.6 06/27/2024    ALT 8 (L) 07/03/2024    AST 12 07/03/2024     07/03/2024    K 4.0 07/03/2024     (H) 07/03/2024    CREATININE 1.05 07/03/2024    BUN 21 07/03/2024    CO2 18 (L) 07/03/2024    PSA 0.72 06/13/2022    INR 1.0 01/11/2023    HGBA1C 5.3 09/11/2020     CT A&P (07/03/24)  1. No acute abnormality within the abdomen or pelvis.  2. No renal or ureteral calculus identified. No hydronephrosis or  hydroureter.  3. No bladder wall thickening.  4. Cholelithiasis without CT evidence of acute cholecystitis.    Scribed for Dr. Ger Hayes by Je Cardenas.  I, Dr. Ger Hayes have personally reviewed and agreed with the information entered by the Virtual Scribe. 07/10/24.

## 2024-07-11 ENCOUNTER — DOCUMENTATION (OUTPATIENT)
Dept: UROLOGY | Facility: CLINIC | Age: 89
End: 2024-07-11

## 2024-07-11 ENCOUNTER — OFFICE VISIT (OUTPATIENT)
Dept: UROLOGY | Facility: CLINIC | Age: 89
End: 2024-07-11
Payer: MEDICARE

## 2024-07-11 VITALS — DIASTOLIC BLOOD PRESSURE: 75 MMHG | HEART RATE: 74 BPM | SYSTOLIC BLOOD PRESSURE: 130 MMHG

## 2024-07-11 DIAGNOSIS — R33.9 URINARY RETENTION: ICD-10-CM

## 2024-07-11 PROCEDURE — 1036F TOBACCO NON-USER: CPT | Performed by: NURSE PRACTITIONER

## 2024-07-11 PROCEDURE — 99213 OFFICE O/P EST LOW 20 MIN: CPT | Performed by: NURSE PRACTITIONER

## 2024-07-11 PROCEDURE — 1159F MED LIST DOCD IN RCRD: CPT | Performed by: NURSE PRACTITIONER

## 2024-07-11 PROCEDURE — G2211 COMPLEX E/M VISIT ADD ON: HCPCS | Performed by: NURSE PRACTITIONER

## 2024-07-11 RX ORDER — TAMSULOSIN HYDROCHLORIDE 0.4 MG/1
0.4 CAPSULE ORAL NIGHTLY
Qty: 90 CAPSULE | Refills: 1 | Status: SHIPPED | OUTPATIENT
Start: 2024-07-11 | End: 2025-01-07

## 2024-07-11 NOTE — PROGRESS NOTES
Subjective   Patient ID: Fran Capps is a 89 y.o. male who presents for Urinary Retention.  Lives in [place], [place].  [facility];   Former Urologist since retired.  Previously saw Dr. Kimball in [place].      Recent ED visit (07/03/24)  Presented for abdominal pain + urinary retention.   CT imaging showed no stones or hydronephrosis BL.   No bladder wall thickening.   Perez was placed for his retention. (~350 mL)  Takes finasteride and doxazosin for urinary symptoms.    Passed in office TOV yesterday. Having frequent low volume voids since then. Denies s/s of retention.           Review of Systems   All other systems reviewed and are negative.      Objective   Physical Exam  Vitals reviewed.     Alert and oriented x3  Moist mucous membranes  Breathes easily on room air  Abdomen soft, nondistended  No edema  No scleral icterus  No focal neurological deficits  Appears stated age, no acute distress  Ambulates with cane    Admission on 07/03/2024, Discharged on 07/03/2024   Component Date Value Ref Range Status    WBC 07/03/2024 4.2 (L)  4.4 - 11.3 x10*3/uL Final    nRBC 07/03/2024 0.0  0.0 - 0.0 /100 WBCs Final    RBC 07/03/2024 4.42 (L)  4.50 - 5.90 x10*6/uL Final    Hemoglobin 07/03/2024 13.8  13.5 - 17.5 g/dL Final    Hematocrit 07/03/2024 39.8 (L)  41.0 - 52.0 % Final    MCV 07/03/2024 90  80 - 100 fL Final    MCH 07/03/2024 31.2  26.0 - 34.0 pg Final    MCHC 07/03/2024 34.7  32.0 - 36.0 g/dL Final    RDW 07/03/2024 13.2  11.5 - 14.5 % Final    Platelets 07/03/2024 156  150 - 450 x10*3/uL Final    Neutrophils % 07/03/2024 58.0  40.0 - 80.0 % Final    Immature Granulocytes %, Automated 07/03/2024 0.2  0.0 - 0.9 % Final    Immature Granulocyte Count (IG) includes promyelocytes, myelocytes and metamyelocytes but does not include bands. Percent differential counts (%) should be interpreted in the context of the absolute cell counts (cells/UL).    Lymphocytes % 07/03/2024 32.7  13.0 - 44.0 % Final     Monocytes % 07/03/2024 7.6  2.0 - 10.0 % Final    Eosinophils % 07/03/2024 1.0  0.0 - 6.0 % Final    Basophils % 07/03/2024 0.5  0.0 - 2.0 % Final    Neutrophils Absolute 07/03/2024 2.43  1.60 - 5.50 x10*3/uL Final    Percent differential counts (%) should be interpreted in the context of the absolute cell counts (cells/uL).    Immature Granulocytes Absolute, Au* 07/03/2024 0.01  0.00 - 0.50 x10*3/uL Final    Lymphocytes Absolute 07/03/2024 1.37  0.80 - 3.00 x10*3/uL Final    Monocytes Absolute 07/03/2024 0.32  0.05 - 0.80 x10*3/uL Final    Eosinophils Absolute 07/03/2024 0.04  0.00 - 0.40 x10*3/uL Final    Basophils Absolute 07/03/2024 0.02  0.00 - 0.10 x10*3/uL Final    Glucose 07/03/2024 110 (H)  74 - 99 mg/dL Final    Sodium 07/03/2024 137  136 - 145 mmol/L Final    Potassium 07/03/2024 4.0  3.5 - 5.3 mmol/L Final    Chloride 07/03/2024 110 (H)  98 - 107 mmol/L Final    Bicarbonate 07/03/2024 18 (L)  21 - 32 mmol/L Final    Anion Gap 07/03/2024 13  10 - 20 mmol/L Final    Urea Nitrogen 07/03/2024 21  6 - 23 mg/dL Final    Creatinine 07/03/2024 1.05  0.50 - 1.30 mg/dL Final    eGFR 07/03/2024 68  >60 mL/min/1.73m*2 Final    Calculations of estimated GFR are performed using the 2021 CKD-EPI Study Refit equation without the race variable for the IDMS-Traceable creatinine methods.  https://jasn.asnjournals.org/content/early/2021/09/22/ASN.5670033575    Calcium 07/03/2024 8.8  8.6 - 10.3 mg/dL Final    Albumin 07/03/2024 3.8  3.4 - 5.0 g/dL Final    Alkaline Phosphatase 07/03/2024 42  33 - 136 U/L Final    Total Protein 07/03/2024 6.3 (L)  6.4 - 8.2 g/dL Final    AST 07/03/2024 12  9 - 39 U/L Final    Bilirubin, Total 07/03/2024 0.6  0.0 - 1.2 mg/dL Final    ALT 07/03/2024 8 (L)  10 - 52 U/L Final    Patients treated with Sulfasalazine may generate falsely decreased results for ALT.    Color, Urine 07/03/2024 Yellow  Light-Yellow, Yellow, Dark-Yellow Final    Appearance, Urine 07/03/2024 Clear  Clear Final     Specific Gravity, Urine 07/03/2024 1.036 (N)  1.005 - 1.035 Final    pH, Urine 07/03/2024 5.5  5.0, 5.5, 6.0, 6.5, 7.0, 7.5, 8.0 Final    Protein, Urine 07/03/2024 NEGATIVE  NEGATIVE, 10 (TRACE), 20 (TRACE) mg/dL Final    Glucose, Urine 07/03/2024 Normal  Normal mg/dL Final    Blood, Urine 07/03/2024 NEGATIVE  NEGATIVE Final    Ketones, Urine 07/03/2024 10 (1+) (A)  NEGATIVE mg/dL Final    Bilirubin, Urine 07/03/2024 NEGATIVE  NEGATIVE Final    Urobilinogen, Urine 07/03/2024 2 (1+) (A)  Normal mg/dL Final    Due to a manufacturing issue, low positive urobilinogen results may be falsely positive. Correlate with urine bilirubin and additional clinical/laboratory findings to assess the risk of hemolytic anemia or liver disease. If clinically indicated, repeat testing with an alternate method is available by contacting the laboratory within 24 hours.    Some pigments and medications may cause a false positive urobilinogen.    Nitrite, Urine 07/03/2024 NEGATIVE  NEGATIVE Final    Leukocyte Esterase, Urine 07/03/2024 NEGATIVE  NEGATIVE Final    Extra Tube 07/03/2024 Hold for add-ons.   Final    Auto resulted.         Assessment/Plan   Diagnoses and all orders for this visit:  Urinary retention  -     Post-Void Residual  -     tamsulosin (Flomax) 0.4 mg 24 hr capsule; Take 1 capsule (0.4 mg) by mouth once daily at bedtime.    Defer replacement of hanson as he is not feeling uncomfortable at this time. Creatinine <1. Stop cardura and switch to tamsulosin. Continue proscar started by Dr. Hayes yesterday. Follow up in a few weeks for PVR check        MARLEE Olvera-CNP 07/11/24 1:42 PM

## 2024-07-11 NOTE — PROGRESS NOTES
Pt's daughter called regarding pt had his hanson catheter that was placed in ED for retention removed yesterday in clinic and today he is urinating but not well .  Pt will see CARLOS MANUEL Clark today at 1340 pt aware .

## 2024-08-08 ENCOUNTER — APPOINTMENT (OUTPATIENT)
Dept: UROLOGY | Facility: CLINIC | Age: 89
End: 2024-08-08
Payer: MEDICARE

## 2024-08-12 ENCOUNTER — APPOINTMENT (OUTPATIENT)
Dept: UROLOGY | Facility: CLINIC | Age: 89
End: 2024-08-12
Payer: MEDICARE

## 2024-09-17 ENCOUNTER — APPOINTMENT (OUTPATIENT)
Dept: UROLOGY | Facility: CLINIC | Age: 89
End: 2024-09-17
Payer: MEDICARE

## 2024-09-17 VITALS
SYSTOLIC BLOOD PRESSURE: 142 MMHG | TEMPERATURE: 95.7 F | HEART RATE: 89 BPM | DIASTOLIC BLOOD PRESSURE: 72 MMHG | BODY MASS INDEX: 26.17 KG/M2 | WEIGHT: 215 LBS

## 2024-09-17 DIAGNOSIS — R33.9 URINARY RETENTION: Primary | ICD-10-CM

## 2024-09-17 PROCEDURE — 1159F MED LIST DOCD IN RCRD: CPT | Performed by: NURSE PRACTITIONER

## 2024-09-17 PROCEDURE — G2211 COMPLEX E/M VISIT ADD ON: HCPCS | Performed by: NURSE PRACTITIONER

## 2024-09-17 PROCEDURE — 99213 OFFICE O/P EST LOW 20 MIN: CPT | Performed by: NURSE PRACTITIONER

## 2024-09-17 PROCEDURE — 1036F TOBACCO NON-USER: CPT | Performed by: NURSE PRACTITIONER

## 2024-09-17 NOTE — PROGRESS NOTES
Subjective   Patient ID: Fran Capps is a 89 y.o. male who presents for PVR check.  Lives in [place], [place].  [facility];   Former Urologist since retired.  Previously saw Dr. Kimball in [place].      Recent ED visit (07/03/24)  Presented for abdominal pain + urinary retention.   CT imaging showed no stones or hydronephrosis BL.   No bladder wall thickening.   Perez was placed for his retention. (~350 mL)  Takes finasteride and tamsulosin for urinary symptoms.     Feels that he is emptying moderately well. Occasional urgency and mild UUI. NTF=1. Patient is satisfied              Review of Systems   All other systems reviewed and are negative.      Objective   Physical Exam  Vitals reviewed.     Alert and oriented x3  Moist mucous membranes  Breathes easily on room air  Abdomen soft, nondistended  No edema  No scleral icterus  No focal neurological deficits  Appears stated age, no acute distress  Ambulates with cane    XPT=762em    No visits with results within 1 Day(s) from this visit.   Latest known visit with results is:   Admission on 07/03/2024, Discharged on 07/03/2024   Component Date Value Ref Range Status    WBC 07/03/2024 4.2 (L)  4.4 - 11.3 x10*3/uL Final    nRBC 07/03/2024 0.0  0.0 - 0.0 /100 WBCs Final    RBC 07/03/2024 4.42 (L)  4.50 - 5.90 x10*6/uL Final    Hemoglobin 07/03/2024 13.8  13.5 - 17.5 g/dL Final    Hematocrit 07/03/2024 39.8 (L)  41.0 - 52.0 % Final    MCV 07/03/2024 90  80 - 100 fL Final    MCH 07/03/2024 31.2  26.0 - 34.0 pg Final    MCHC 07/03/2024 34.7  32.0 - 36.0 g/dL Final    RDW 07/03/2024 13.2  11.5 - 14.5 % Final    Platelets 07/03/2024 156  150 - 450 x10*3/uL Final    Neutrophils % 07/03/2024 58.0  40.0 - 80.0 % Final    Immature Granulocytes %, Automated 07/03/2024 0.2  0.0 - 0.9 % Final    Immature Granulocyte Count (IG) includes promyelocytes, myelocytes and metamyelocytes but does not include bands. Percent differential counts (%) should be interpreted in the  context of the absolute cell counts (cells/UL).    Lymphocytes % 07/03/2024 32.7  13.0 - 44.0 % Final    Monocytes % 07/03/2024 7.6  2.0 - 10.0 % Final    Eosinophils % 07/03/2024 1.0  0.0 - 6.0 % Final    Basophils % 07/03/2024 0.5  0.0 - 2.0 % Final    Neutrophils Absolute 07/03/2024 2.43  1.60 - 5.50 x10*3/uL Final    Percent differential counts (%) should be interpreted in the context of the absolute cell counts (cells/uL).    Immature Granulocytes Absolute, Au* 07/03/2024 0.01  0.00 - 0.50 x10*3/uL Final    Lymphocytes Absolute 07/03/2024 1.37  0.80 - 3.00 x10*3/uL Final    Monocytes Absolute 07/03/2024 0.32  0.05 - 0.80 x10*3/uL Final    Eosinophils Absolute 07/03/2024 0.04  0.00 - 0.40 x10*3/uL Final    Basophils Absolute 07/03/2024 0.02  0.00 - 0.10 x10*3/uL Final    Glucose 07/03/2024 110 (H)  74 - 99 mg/dL Final    Sodium 07/03/2024 137  136 - 145 mmol/L Final    Potassium 07/03/2024 4.0  3.5 - 5.3 mmol/L Final    Chloride 07/03/2024 110 (H)  98 - 107 mmol/L Final    Bicarbonate 07/03/2024 18 (L)  21 - 32 mmol/L Final    Anion Gap 07/03/2024 13  10 - 20 mmol/L Final    Urea Nitrogen 07/03/2024 21  6 - 23 mg/dL Final    Creatinine 07/03/2024 1.05  0.50 - 1.30 mg/dL Final    eGFR 07/03/2024 68  >60 mL/min/1.73m*2 Final    Calculations of estimated GFR are performed using the 2021 CKD-EPI Study Refit equation without the race variable for the IDMS-Traceable creatinine methods.  https://jasn.asnjournals.org/content/early/2021/09/22/ASN.4094274220    Calcium 07/03/2024 8.8  8.6 - 10.3 mg/dL Final    Albumin 07/03/2024 3.8  3.4 - 5.0 g/dL Final    Alkaline Phosphatase 07/03/2024 42  33 - 136 U/L Final    Total Protein 07/03/2024 6.3 (L)  6.4 - 8.2 g/dL Final    AST 07/03/2024 12  9 - 39 U/L Final    Bilirubin, Total 07/03/2024 0.6  0.0 - 1.2 mg/dL Final    ALT 07/03/2024 8 (L)  10 - 52 U/L Final    Patients treated with Sulfasalazine may generate falsely decreased results for ALT.    Color, Urine 07/03/2024  Yellow  Light-Yellow, Yellow, Dark-Yellow Final    Appearance, Urine 07/03/2024 Clear  Clear Final    Specific Gravity, Urine 07/03/2024 1.036 (N)  1.005 - 1.035 Final    pH, Urine 07/03/2024 5.5  5.0, 5.5, 6.0, 6.5, 7.0, 7.5, 8.0 Final    Protein, Urine 07/03/2024 NEGATIVE  NEGATIVE, 10 (TRACE), 20 (TRACE) mg/dL Final    Glucose, Urine 07/03/2024 Normal  Normal mg/dL Final    Blood, Urine 07/03/2024 NEGATIVE  NEGATIVE Final    Ketones, Urine 07/03/2024 10 (1+) (A)  NEGATIVE mg/dL Final    Bilirubin, Urine 07/03/2024 NEGATIVE  NEGATIVE Final    Urobilinogen, Urine 07/03/2024 2 (1+) (A)  Normal mg/dL Final    Due to a manufacturing issue, low positive urobilinogen results may be falsely positive. Correlate with urine bilirubin and additional clinical/laboratory findings to assess the risk of hemolytic anemia or liver disease. If clinically indicated, repeat testing with an alternate method is available by contacting the laboratory within 24 hours.    Some pigments and medications may cause a false positive urobilinogen.    Nitrite, Urine 07/03/2024 NEGATIVE  NEGATIVE Final    Leukocyte Esterase, Urine 07/03/2024 NEGATIVE  NEGATIVE Final    Extra Tube 07/03/2024 Hold for add-ons.   Final    Auto resulted.         Assessment/Plan   Diagnoses and all orders for this visit:  Urinary retention  -     Post-Void Residual    Continue with current plan of care. Encouraged to remain on tamsulosin and finasteride. Plan for 3 month follow up or sooner if needed.       Karie Clark, MARLEE-CNP 09/17/24 2:32 PM

## 2024-10-15 ENCOUNTER — LAB (OUTPATIENT)
Dept: LAB | Facility: LAB | Age: 89
End: 2024-10-15
Payer: COMMERCIAL

## 2024-10-15 DIAGNOSIS — N18.2 CHRONIC KIDNEY DISEASE, STAGE 2 (MILD): ICD-10-CM

## 2024-10-15 DIAGNOSIS — E78.49 OTHER HYPERLIPIDEMIA: Primary | ICD-10-CM

## 2024-10-15 DIAGNOSIS — I12.9 HYPERTENSIVE CHRONIC KIDNEY DISEASE WITH STAGE 1 THROUGH STAGE 4 CHRONIC KIDNEY DISEASE, OR UNSPECIFIED CHRONIC KIDNEY DISEASE: ICD-10-CM

## 2024-10-15 LAB
ALBUMIN SERPL BCP-MCNC: 3.8 G/DL (ref 3.4–5)
ALP SERPL-CCNC: 51 U/L (ref 33–136)
ALT SERPL W P-5'-P-CCNC: 12 U/L (ref 10–52)
ANION GAP SERPL CALC-SCNC: 10 MMOL/L (ref 10–20)
AST SERPL W P-5'-P-CCNC: 11 U/L (ref 9–39)
BASOPHILS # BLD AUTO: 0.03 X10*3/UL (ref 0–0.1)
BASOPHILS NFR BLD AUTO: 0.6 %
BILIRUB SERPL-MCNC: 0.8 MG/DL (ref 0–1.2)
BUN SERPL-MCNC: 23 MG/DL (ref 6–23)
CALCIUM SERPL-MCNC: 8.6 MG/DL (ref 8.6–10.3)
CHLORIDE SERPL-SCNC: 110 MMOL/L (ref 98–107)
CHOLEST SERPL-MCNC: 110 MG/DL (ref 0–199)
CHOLESTEROL/HDL RATIO: 3
CO2 SERPL-SCNC: 23 MMOL/L (ref 21–32)
CREAT SERPL-MCNC: 0.94 MG/DL (ref 0.5–1.3)
EGFRCR SERPLBLD CKD-EPI 2021: 77 ML/MIN/1.73M*2
EOSINOPHIL # BLD AUTO: 0.09 X10*3/UL (ref 0–0.4)
EOSINOPHIL NFR BLD AUTO: 1.9 %
ERYTHROCYTE [DISTWIDTH] IN BLOOD BY AUTOMATED COUNT: 13.4 % (ref 11.5–14.5)
GLUCOSE SERPL-MCNC: 108 MG/DL (ref 74–99)
HCT VFR BLD AUTO: 39.7 % (ref 41–52)
HDLC SERPL-MCNC: 37.1 MG/DL
HGB BLD-MCNC: 13.1 G/DL (ref 13.5–17.5)
IMM GRANULOCYTES # BLD AUTO: 0.02 X10*3/UL (ref 0–0.5)
IMM GRANULOCYTES NFR BLD AUTO: 0.4 % (ref 0–0.9)
LDLC SERPL CALC-MCNC: 46 MG/DL
LYMPHOCYTES # BLD AUTO: 2.27 X10*3/UL (ref 0.8–3)
LYMPHOCYTES NFR BLD AUTO: 47.5 %
MCH RBC QN AUTO: 30.6 PG (ref 26–34)
MCHC RBC AUTO-ENTMCNC: 33 G/DL (ref 32–36)
MCV RBC AUTO: 93 FL (ref 80–100)
MONOCYTES # BLD AUTO: 0.37 X10*3/UL (ref 0.05–0.8)
MONOCYTES NFR BLD AUTO: 7.7 %
NEUTROPHILS # BLD AUTO: 2 X10*3/UL (ref 1.6–5.5)
NEUTROPHILS NFR BLD AUTO: 41.9 %
NON HDL CHOLESTEROL: 73 MG/DL (ref 0–149)
NRBC BLD-RTO: 0 /100 WBCS (ref 0–0)
PLATELET # BLD AUTO: 151 X10*3/UL (ref 150–450)
POTASSIUM SERPL-SCNC: 4 MMOL/L (ref 3.5–5.3)
PROT SERPL-MCNC: 5.9 G/DL (ref 6.4–8.2)
RBC # BLD AUTO: 4.28 X10*6/UL (ref 4.5–5.9)
SODIUM SERPL-SCNC: 139 MMOL/L (ref 136–145)
TRIGL SERPL-MCNC: 134 MG/DL (ref 0–149)
VLDL: 27 MG/DL (ref 0–40)
WBC # BLD AUTO: 4.8 X10*3/UL (ref 4.4–11.3)

## 2024-10-15 PROCEDURE — 36415 COLL VENOUS BLD VENIPUNCTURE: CPT

## 2024-10-15 PROCEDURE — 80053 COMPREHEN METABOLIC PANEL: CPT

## 2024-10-15 PROCEDURE — 85025 COMPLETE CBC W/AUTO DIFF WBC: CPT

## 2024-10-15 PROCEDURE — 80061 LIPID PANEL: CPT

## 2024-11-12 ENCOUNTER — LAB REQUISITION (OUTPATIENT)
Dept: LAB | Facility: HOSPITAL | Age: 89
End: 2024-11-12
Payer: MEDICARE

## 2024-11-12 DIAGNOSIS — R82.998 OTHER ABNORMAL FINDINGS IN URINE: ICD-10-CM

## 2024-11-12 PROCEDURE — 87086 URINE CULTURE/COLONY COUNT: CPT

## 2024-11-14 DIAGNOSIS — R33.9 URINARY RETENTION: ICD-10-CM

## 2024-11-14 LAB — BACTERIA UR CULT: NORMAL

## 2024-11-18 RX ORDER — TAMSULOSIN HYDROCHLORIDE 0.4 MG/1
0.4 CAPSULE ORAL
Qty: 90 CAPSULE | Refills: 1 | Status: SHIPPED | OUTPATIENT
Start: 2024-11-18

## 2024-12-17 ENCOUNTER — APPOINTMENT (OUTPATIENT)
Dept: UROLOGY | Facility: CLINIC | Age: 89
End: 2024-12-17
Payer: MEDICARE

## 2024-12-17 VITALS — HEART RATE: 62 BPM | DIASTOLIC BLOOD PRESSURE: 63 MMHG | SYSTOLIC BLOOD PRESSURE: 122 MMHG

## 2024-12-17 DIAGNOSIS — N40.1 BENIGN PROSTATIC HYPERPLASIA WITH URINARY OBSTRUCTION: Primary | ICD-10-CM

## 2024-12-17 DIAGNOSIS — R33.9 URINARY RETENTION: ICD-10-CM

## 2024-12-17 DIAGNOSIS — N13.8 BENIGN PROSTATIC HYPERPLASIA WITH URINARY OBSTRUCTION: Primary | ICD-10-CM

## 2024-12-17 PROCEDURE — 99213 OFFICE O/P EST LOW 20 MIN: CPT | Performed by: NURSE PRACTITIONER

## 2024-12-17 PROCEDURE — 1159F MED LIST DOCD IN RCRD: CPT | Performed by: NURSE PRACTITIONER

## 2024-12-17 PROCEDURE — G2211 COMPLEX E/M VISIT ADD ON: HCPCS | Performed by: NURSE PRACTITIONER

## 2024-12-17 RX ORDER — FINASTERIDE 5 MG/1
5 TABLET, FILM COATED ORAL DAILY
Qty: 90 TABLET | Refills: 3 | Status: SHIPPED | OUTPATIENT
Start: 2024-12-17

## 2024-12-17 RX ORDER — TAMSULOSIN HYDROCHLORIDE 0.4 MG/1
0.4 CAPSULE ORAL DAILY
Qty: 90 CAPSULE | Refills: 3 | Status: SHIPPED | OUTPATIENT
Start: 2024-12-17 | End: 2025-12-17

## 2024-12-17 NOTE — PROGRESS NOTES
Subjective   Patient ID: Fran Capps is a 89 y.o. male who presents for PVR Check .  Lives in [place], [place].  [facility];   Former Urologist since retired.  Previously saw Dr. Kimball in [place].      Recent ED visit (07/03/24)  Presented for abdominal pain + urinary retention.   CT imaging showed no stones or hydronephrosis BL.   No bladder wall thickening.   Perez was placed for his retention. (~350 mL)  Takes finasteride and tamsulosin for urinary symptoms.     Feels that he is emptying moderately well. Occasional urgency and mild UUI. NTF=1-2. Denies dysuria.           Review of Systems   All other systems reviewed and are negative.      Objective   Physical Exam  Vitals reviewed.     Alert and oriented x3  Moist mucous membranes  Breathes easily on room air  Abdomen soft, nondistended. Obese  No edema  No scleral icterus  No focal neurological deficits  Appears stated age, no acute distress    No visits with results within 1 Day(s) from this visit.   Latest known visit with results is:   Lab Requisition on 11/12/2024   Component Date Value Ref Range Status    Urine Culture 11/12/2024 Normal genitourinary iftikhar   Final     CPS=[id number]    Assessment/Plan   Diagnoses and all orders for this visit:  Benign prostatic hyperplasia with urinary obstruction  -     finasteride (Proscar) 5 mg tablet; Take 1 tablet (5 mg) by mouth once daily.  Urinary retention  -     Post-Void Residual  -     tamsulosin (Flomax) 0.4 mg 24 hr capsule; Take 1 capsule (0.4 mg) by mouth once daily.  -     finasteride (Proscar) 5 mg tablet; Take 1 tablet (5 mg) by mouth once daily.    Pleased with control of symptoms at present. Continue on tamsulosin and finasteride. Plan for 3 month follow up or sooner if needed. Patient verbalized understanding of plan.        Karie Clark, MARLEE-CNP 12/17/24 11:48 AM

## 2025-03-17 ENCOUNTER — APPOINTMENT (OUTPATIENT)
Dept: UROLOGY | Facility: CLINIC | Age: OVER 89
End: 2025-03-17
Payer: MEDICARE

## 2025-03-17 VITALS — SYSTOLIC BLOOD PRESSURE: 126 MMHG | HEART RATE: 67 BPM | DIASTOLIC BLOOD PRESSURE: 60 MMHG

## 2025-03-17 DIAGNOSIS — N40.1 BENIGN PROSTATIC HYPERPLASIA WITH URINARY OBSTRUCTION: ICD-10-CM

## 2025-03-17 DIAGNOSIS — R33.9 URINARY RETENTION: Primary | ICD-10-CM

## 2025-03-17 DIAGNOSIS — N13.8 BENIGN PROSTATIC HYPERPLASIA WITH URINARY OBSTRUCTION: ICD-10-CM

## 2025-03-17 PROCEDURE — 99213 OFFICE O/P EST LOW 20 MIN: CPT | Performed by: NURSE PRACTITIONER

## 2025-03-17 PROCEDURE — 1036F TOBACCO NON-USER: CPT | Performed by: NURSE PRACTITIONER

## 2025-03-17 PROCEDURE — 1159F MED LIST DOCD IN RCRD: CPT | Performed by: NURSE PRACTITIONER

## 2025-03-17 RX ORDER — TAMSULOSIN HYDROCHLORIDE 0.4 MG/1
0.4 CAPSULE ORAL DAILY
Qty: 90 CAPSULE | Refills: 3 | Status: SHIPPED | OUTPATIENT
Start: 2025-03-17 | End: 2026-03-17

## 2025-03-17 RX ORDER — FINASTERIDE 5 MG/1
5 TABLET, FILM COATED ORAL DAILY
Qty: 90 TABLET | Refills: 3 | Status: SHIPPED | OUTPATIENT
Start: 2025-03-17

## 2025-03-17 NOTE — PROGRESS NOTES
Subjective   Patient ID: Fran Capps is a 89 y.o. male who presents for No chief complaint on file..  Lives in [place], [place].  [facility];   Former Urologist since retired.  Previously saw Dr. Kimball in [place].     History of retention in July 2024 requiring hanson cath.   Presented for abdominal pain + urinary retention.   CT imaging showed no stones or hydronephrosis BL. No hydro noted.   Takes finasteride and tamsulosin for urinary symptoms.     Feels that he is emptying moderately well. Occasional urgency and mild UUI. NTF=1-2. Denies dysuria. Pleased with symptoms.           Review of Systems   All other systems reviewed and are negative.      Objective   Physical Exam  Vitals reviewed.     Alert and oriented x3  Moist mucous membranes  Breathes easily on room air  Abdomen soft, nondistended  No edema  No scleral icterus  No focal neurological deficits  Appears stated age, no acute distress  Ambulates with cane     [id number]    Lab Results   Component Value Date    GLUCOSE 108 (H) 10/15/2024    CALCIUM 8.6 10/15/2024     10/15/2024    K 4.0 10/15/2024    CO2 23 10/15/2024     (H) 10/15/2024    BUN 23 10/15/2024    CREATININE 0.94 10/15/2024       Assessment/Plan   Diagnoses and all orders for this visit:  Urinary retention  -     Post-Void Residual  -     finasteride (Proscar) 5 mg tablet; Take 1 tablet (5 mg) by mouth once daily.  -     tamsulosin (Flomax) 0.4 mg 24 hr capsule; Take 1 capsule (0.4 mg) by mouth once daily.  Benign prostatic hyperplasia with urinary obstruction  -     finasteride (Proscar) 5 mg tablet; Take 1 tablet (5 mg) by mouth once daily.    Doing well on tamsulosin and finasteride. Pleased with control of symptoms at present. Plan for 6 month follow up       MARLEE Olvera-CNP 03/17/25 11:15 AM

## 2025-07-16 ENCOUNTER — APPOINTMENT (OUTPATIENT)
Dept: UROLOGY | Facility: CLINIC | Age: OVER 89
End: 2025-07-16
Payer: MEDICARE

## 2025-09-19 ENCOUNTER — APPOINTMENT (OUTPATIENT)
Dept: UROLOGY | Facility: CLINIC | Age: OVER 89
End: 2025-09-19
Payer: MEDICARE

## 2025-10-06 ENCOUNTER — APPOINTMENT (OUTPATIENT)
Dept: UROLOGY | Facility: CLINIC | Age: OVER 89
End: 2025-10-06
Payer: MEDICARE